# Patient Record
Sex: FEMALE | URBAN - METROPOLITAN AREA
[De-identification: names, ages, dates, MRNs, and addresses within clinical notes are randomized per-mention and may not be internally consistent; named-entity substitution may affect disease eponyms.]

---

## 2023-06-23 ENCOUNTER — NURSING HOME VISIT (OUTPATIENT)
Dept: POST ACUTE CARE | Facility: EXTERNAL LOCATION | Age: 88
End: 2023-06-23
Payer: MEDICARE

## 2023-06-23 DIAGNOSIS — E66.09 CLASS 1 OBESITY DUE TO EXCESS CALORIES WITH SERIOUS COMORBIDITY AND BODY MASS INDEX (BMI) OF 30.0 TO 30.9 IN ADULT: ICD-10-CM

## 2023-06-23 DIAGNOSIS — Z85.038 HISTORY OF COLON CANCER: ICD-10-CM

## 2023-06-23 DIAGNOSIS — M81.0 AGE-RELATED OSTEOPOROSIS WITHOUT CURRENT PATHOLOGICAL FRACTURE: ICD-10-CM

## 2023-06-23 DIAGNOSIS — K21.9 GASTROESOPHAGEAL REFLUX DISEASE WITHOUT ESOPHAGITIS: ICD-10-CM

## 2023-06-23 DIAGNOSIS — E03.9 HYPOTHYROIDISM, UNSPECIFIED TYPE: ICD-10-CM

## 2023-06-23 DIAGNOSIS — D64.9 ANEMIA, UNSPECIFIED TYPE: Primary | ICD-10-CM

## 2023-06-23 DIAGNOSIS — I10 HYPERTENSION, UNSPECIFIED TYPE: ICD-10-CM

## 2023-06-23 DIAGNOSIS — M19.90 OSTEOARTHRITIS, UNSPECIFIED OSTEOARTHRITIS TYPE, UNSPECIFIED SITE: ICD-10-CM

## 2023-06-23 PROBLEM — E66.811 CLASS 1 OBESITY DUE TO EXCESS CALORIES WITH SERIOUS COMORBIDITY AND BODY MASS INDEX (BMI) OF 30.0 TO 30.9 IN ADULT: Status: ACTIVE | Noted: 2023-06-23

## 2023-06-23 PROCEDURE — 99306 1ST NF CARE HIGH MDM 50: CPT | Performed by: FAMILY MEDICINE

## 2023-06-23 NOTE — LETTER
Patient: Marge Cheema  : 1928    Encounter Date: 2023    Documentation at [facility]    Problem List Items Addressed This Visit       Anemia - Primary    Hypertension    Hypothyroidism    Class 1 obesity due to excess calories with serious comorbidity and body mass index (BMI) of 30.0 to 30.9 in adult    Osteoarthritis    Age-related osteoporosis without current pathological fracture    Gastroesophageal reflux disease without esophagitis    History of colon cancer     Spoke with son  Had been living in [facility].  Goal is to return      Electronically Signed By: Joel De La Cruz DO   23  9:35 AM

## 2023-06-23 NOTE — PROGRESS NOTES
Documentation at [facility]    Problem List Items Addressed This Visit       Anemia - Primary    Hypertension    Hypothyroidism    Class 1 obesity due to excess calories with serious comorbidity and body mass index (BMI) of 30.0 to 30.9 in adult    Osteoarthritis    Age-related osteoporosis without current pathological fracture    Gastroesophageal reflux disease without esophagitis    History of colon cancer     Spoke with son  Had been living in [facility].  Goal is to return

## 2023-07-08 ENCOUNTER — NURSING HOME VISIT (OUTPATIENT)
Dept: POST ACUTE CARE | Facility: EXTERNAL LOCATION | Age: 88
End: 2023-07-08
Payer: MEDICARE

## 2023-07-08 DIAGNOSIS — E66.09 CLASS 1 OBESITY DUE TO EXCESS CALORIES WITH SERIOUS COMORBIDITY AND BODY MASS INDEX (BMI) OF 30.0 TO 30.9 IN ADULT: ICD-10-CM

## 2023-07-08 DIAGNOSIS — Z85.038 HISTORY OF COLON CANCER: ICD-10-CM

## 2023-07-08 DIAGNOSIS — E03.4 HYPOTHYROIDISM DUE TO ACQUIRED ATROPHY OF THYROID: ICD-10-CM

## 2023-07-08 DIAGNOSIS — M81.0 AGE-RELATED OSTEOPOROSIS WITHOUT CURRENT PATHOLOGICAL FRACTURE: ICD-10-CM

## 2023-07-08 DIAGNOSIS — D64.9 ANEMIA, UNSPECIFIED TYPE: Primary | ICD-10-CM

## 2023-07-08 DIAGNOSIS — M15.9 PRIMARY OSTEOARTHRITIS INVOLVING MULTIPLE JOINTS: ICD-10-CM

## 2023-07-08 DIAGNOSIS — I10 HYPERTENSION, UNSPECIFIED TYPE: ICD-10-CM

## 2023-07-08 PROCEDURE — 99342 HOME/RES VST NEW LOW MDM 30: CPT | Performed by: FAMILY MEDICINE

## 2023-07-08 NOTE — PROGRESS NOTES
Documentation at [facility]  Readmit from SNU after spontaneous significant anemia and transfusion    Problem List Items Addressed This Visit       Anemia - Primary    Hypertension    Hypothyroidism    Class 1 obesity due to excess calories with serious comorbidity and body mass index (BMI) of 30.0 to 30.9 in adult    Osteoarthritis    Age-related osteoporosis without current pathological fracture    History of colon cancer     Met with son at [facility].

## 2023-07-08 NOTE — LETTER
Patient: Marge Cheema  : 1928    Encounter Date: 2023    Documentation at [facility]  Readmit from SNU after spontaneous significant anemia and transfusion    Problem List Items Addressed This Visit       Anemia - Primary    Hypertension    Hypothyroidism    Class 1 obesity due to excess calories with serious comorbidity and body mass index (BMI) of 30.0 to 30.9 in adult    Osteoarthritis    Age-related osteoporosis without current pathological fracture    History of colon cancer     Met with son at hospitals.        Electronically Signed By: Joel De La Cruz DO   23  2:10 PM

## 2023-12-10 ENCOUNTER — NURSING HOME VISIT (OUTPATIENT)
Dept: POST ACUTE CARE | Facility: EXTERNAL LOCATION | Age: 88
End: 2023-12-10
Payer: MEDICARE

## 2023-12-10 DIAGNOSIS — K21.9 GASTROESOPHAGEAL REFLUX DISEASE WITHOUT ESOPHAGITIS: ICD-10-CM

## 2023-12-10 DIAGNOSIS — E66.09 CLASS 1 OBESITY DUE TO EXCESS CALORIES WITH SERIOUS COMORBIDITY AND BODY MASS INDEX (BMI) OF 30.0 TO 30.9 IN ADULT: ICD-10-CM

## 2023-12-10 DIAGNOSIS — E03.9 HYPOTHYROIDISM, UNSPECIFIED TYPE: ICD-10-CM

## 2023-12-10 DIAGNOSIS — I10 HYPERTENSION, UNSPECIFIED TYPE: ICD-10-CM

## 2023-12-10 DIAGNOSIS — D64.9 ANEMIA, UNSPECIFIED TYPE: Primary | ICD-10-CM

## 2023-12-10 DIAGNOSIS — R01.1 HEART MURMUR: ICD-10-CM

## 2023-12-10 DIAGNOSIS — M15.9 OSTEOARTHRITIS OF MULTIPLE JOINTS, UNSPECIFIED OSTEOARTHRITIS TYPE: ICD-10-CM

## 2023-12-10 DIAGNOSIS — M81.0 AGE-RELATED OSTEOPOROSIS WITHOUT CURRENT PATHOLOGICAL FRACTURE: ICD-10-CM

## 2023-12-10 PROCEDURE — 99306 1ST NF CARE HIGH MDM 50: CPT | Performed by: FAMILY MEDICINE

## 2023-12-10 NOTE — LETTER
Patient: Marge Cheema  : 1928    Encounter Date: 12/10/2023    Documentation at [facility]    Transferred from [facility] to [facility]    Problem List Items Addressed This Visit       Anemia - Primary    Hypertension    Hypothyroidism    Class 1 obesity due to excess calories with serious comorbidity and body mass index (BMI) of 30.0 to 30.9 in adult    Osteoarthritis    Age-related osteoporosis without current pathological fracture    Gastroesophageal reflux disease without esophagitis    Heart murmur     Searched Epic for echocardiogram - none available      Electronically Signed By: Joel De La Cruz DO   12/10/23  9:24 PM

## 2023-12-11 NOTE — PROGRESS NOTES
Documentation at [facility]    Transferred from [facility] to [facility]    Problem List Items Addressed This Visit       Anemia - Primary    Hypertension    Hypothyroidism    Class 1 obesity due to excess calories with serious comorbidity and body mass index (BMI) of 30.0 to 30.9 in adult    Osteoarthritis    Age-related osteoporosis without current pathological fracture    Gastroesophageal reflux disease without esophagitis    Heart murmur     Searched Epic for echocardiogram - none available

## 2024-04-08 ENCOUNTER — NURSING HOME VISIT (OUTPATIENT)
Dept: POST ACUTE CARE | Facility: EXTERNAL LOCATION | Age: 89
End: 2024-04-08
Payer: MEDICARE

## 2024-04-08 DIAGNOSIS — E66.09 CLASS 1 OBESITY DUE TO EXCESS CALORIES WITH SERIOUS COMORBIDITY AND BODY MASS INDEX (BMI) OF 30.0 TO 30.9 IN ADULT: ICD-10-CM

## 2024-04-08 DIAGNOSIS — M15.9 OSTEOARTHRITIS OF MULTIPLE JOINTS, UNSPECIFIED OSTEOARTHRITIS TYPE: ICD-10-CM

## 2024-04-08 DIAGNOSIS — I10 HYPERTENSION, UNSPECIFIED TYPE: Primary | ICD-10-CM

## 2024-04-08 DIAGNOSIS — R01.1 HEART MURMUR: ICD-10-CM

## 2024-04-08 DIAGNOSIS — D63.8 ANEMIA IN OTHER CHRONIC DISEASES CLASSIFIED ELSEWHERE: ICD-10-CM

## 2024-04-08 PROBLEM — E66.9 OBESITY (BMI 30-39.9): Status: ACTIVE | Noted: 2024-04-08

## 2024-04-08 PROBLEM — R26.2 AMBULATORY DYSFUNCTION: Status: ACTIVE | Noted: 2024-04-08

## 2024-04-08 PROCEDURE — 99309 SBSQ NF CARE MODERATE MDM 30: CPT | Performed by: FAMILY MEDICINE

## 2024-04-08 NOTE — LETTER
"Patient: Marge Cheema  : 1928    Encounter Date: 2024    Documentation at [facility]    Problem List Items Addressed This Visit       Anemia in other chronic diseases classified elsewhere    HTN (hypertension) - Primary    Class 1 obesity due to excess calories with serious comorbidity and body mass index (BMI) of 30.0 to 30.9 in adult    Osteoarthritis    Heart murmur     Complained of loose stools 1-2 x/day  PLAN: decrease frequency of Miralax    Complained of arthritis pain \"all over\"  PLAN:  trial Celebrex    Lab ordered      Electronically Signed By: Joel De La Cruz DO   24 10:58 PM  "

## 2024-04-09 NOTE — PROGRESS NOTES
"Documentation at [facility]    Problem List Items Addressed This Visit       Anemia in other chronic diseases classified elsewhere    HTN (hypertension) - Primary    Class 1 obesity due to excess calories with serious comorbidity and body mass index (BMI) of 30.0 to 30.9 in adult    Osteoarthritis    Heart murmur     Complained of loose stools 1-2 x/day  PLAN: decrease frequency of Miralax    Complained of arthritis pain \"all over\"  PLAN:  trial Celebrex    Lab ordered  "

## 2024-08-15 ENCOUNTER — APPOINTMENT (OUTPATIENT)
Dept: OBSTETRICS AND GYNECOLOGY | Facility: CLINIC | Age: 89
End: 2024-08-15
Payer: MEDICARE

## 2024-11-07 ENCOUNTER — APPOINTMENT (OUTPATIENT)
Dept: OBSTETRICS AND GYNECOLOGY | Facility: CLINIC | Age: 89
End: 2024-11-07
Payer: COMMERCIAL

## 2024-11-22 ENCOUNTER — APPOINTMENT (OUTPATIENT)
Dept: OBSTETRICS AND GYNECOLOGY | Facility: CLINIC | Age: 89
End: 2024-11-22
Payer: COMMERCIAL